# Patient Record
(demographics unavailable — no encounter records)

---

## 2020-10-03 NOTE — EKG
[facility] [address], [place] [address]

Test Date:    2020-10-03               Test Time:    01:26:07

Pat Name:     ROSALES RODRIGUEZ           Department:   

Patient ID:   [id number]           Room:          

Gender:       F                        Technician:   NEHEMIAH

:          1974               Requested By: MIGUEL A CORONEL

Order Number: [id number]            Reading MD:     

                                 Measurements

Intervals                              Axis          

Rate:         101                      P:            66

NJ:           144                      QRS:          77

QRSD:         76                       T:            71

QT:           374                                    

QTc:          486                                    

                           Interpretive Statements

SINUS TACHYCARDIA

VENTRICULAR PREMATURE COMPLEX(ES)

LEFT ATRIAL ABNORMALITY

ABNORMAL ECG

RI6.02

No previous ECG available for comparison

## 2020-10-03 NOTE — PHYS DOC
Past History


Past Medical History:  Anxiety, GERD, Other


Past Surgical History:  Appendectomy, , Other


Smoking:  Cigarettes, Greater than 1 pack/day


Alcohol Use:  None


Drug Use:  Cocaine, Marijuana, Methamphetamine





General Adult


EDM:


Chief Complaint:  DRUG ABUSE





HPI:


HPI:


"... I don't know what the fuck.. is going on.... I did about 10 dollars worth 

of meth yesterday.. smoked it.. but it did not feel like meth... I ve been 

fucked up in the head since.. seeing colors,   sprinkled lights,  high... not 

the meth feeling.. I do about meth at least 10 dollars a day.. smoke it.. and 

only shoot up once a year..  I do marijuana all day... but new lisa gave me the 

meth.. that has fucked up my mind.. I was doing laundry. . at my friends house 

tonight.. and started really tripping.. really fucked up in the head... memory 

is messed up..  I  can't focus... I know meth.. but that was not meth I did 

yesterday..."





Patient is a 45 year old female who presents with complaints of hallucinations 

primarily visual after shooting up meth yesterday.  Complains of difficulty in 

maintaining focus and memory since the IV drug use yesterday.  Patient states 

she normally only smokes meth daily and only IV use maybe once or twice a year. 

Patient does smoke tobacco and marijuana daily.  Patient denies any trauma.  

Patient denies any fever chills.  Patient denies any travel outside the [place] area.  Patient denies any history of immunosuppression.  Patient normally 

follows at [facility]. Pt.  follow-s with Dr. Cooper.





Review of Systems:


Review of Systems:


Constitutional:  Denies fever or chills 


Eyes:  Denies change in visual acuity 


HENT:  Denies nasal congestion or sore throat 


Respiratory:  Denies cough or shortness of breath 


Cardiovascular:  Denies chest pain or edema 


GI:  Denies abdominal pain, nausea, vomiting, bloody stools or diarrhea 


: Denies dysuria 


Musculoskeletal:  Denies back pain or joint pain 


Integument:  Denies rash 


Neurologic:  Denies headache, focal weakness or sensory changes 


Endocrine:  Denies polyuria or polydipsia 


Lymphatic:  Denies swollen glands 


Psychiatric:  Complaints of visual hallucinations and  anxiety





Heart Score:


HEART Score for Chest Pain:  








HEART Score for Chest Pain Response (Comments) Value


 


History Slighlty/Non-Suspicious 0


 


ECG Nonspecific Repolarizatio 1


 


Age >45 - < 65 1


 


Risk Factors                            1 or 2 Risk Factors 1


 


Troponin < Normal Limit 0


 


Total  3








Risk Factors:


Risk Factors:  DM, Current or recent (<one month) smoker, HTN, HLP, family 

history of CAD, obesity.


Risk Scores:


Score 0 - 3:  2.5% MACE over next 6 weeks - Discharge Home


Score 4 - 6:  20.3% MACE over next 6 weeks - Admit for Clinical Observation


Score 7 - 10:  72.7% MACE over next 6 weeks - Early Invasive Strategies





Family History:


Family History:


Noncontributory





Current Medications:


Current Meds:


See nursing for home meds





Allergies:


Allergies:





Allergies








Coded Allergies Type Severity Reaction Last Updated Verified


 


  Penicillins Allergy Unknown  14 No


 


  banana Allergy Unknown  17 Yes


 


  egg Allergy Unknown  17 Yes











Physical Exam:


PE:





Constitutional:  no acute distress, non-toxic appearance. []


HENT: Normocephalic, atraumatic, bilateral external ears normal, oropharynx 

moist, no oral exudates, nose normal. []


Eyes: PERRLA, EOMI, conjunctiva normal, no discharge. [] 


Neck: Normal range of motion, no tenderness, supple, no stridor. [] 


Cardiovascular: Tachycardia heart rate regular rhythm, no murmur []


Lungs & Thorax:  Bilateral breath sounds equal apex with scattered wheezes on 

auscultation []


Abdomen: Bowel sounds normal, soft, no tenderness, no masses, no pulsatile 

masses.  Old surgery scars


Skin: Warm, dry, no erythema, no rash.  Needle injection sites


Back: No tenderness, no CVA tenderness. [] 


Extremities: No tenderness, no cyanosis, no clubbing, ROM intact, no edema. [] 


Neurologic: Alert and oriented X 3, normal motor function, normal sensory 

function, no focal deficits noted.  DTRs +2 patella.  Ambulatory without 

problems.  No drift.   equal.


Psychologic: Affect anxious, judgement normal, mood normal.  Complains of visual

 hallucinations





Current Patient Data:


Vital Signs:





                                   Vital Signs








  Date Time  Temp Pulse Resp B/P (MAP) Pulse Ox O2 Delivery O2 Flow Rate FiO2


 


10/3/20 00:35 98.1 115 22 140/101 (114) 98   











EKG:


EKG:


My interpretation of EKG shows a sinus tachycardia 101 bpm.  Occasional PVC.  

Does have bimodal P waves and left leads.  No findings of acute STEMI or 

contralateral changes.  []





Radiology/Procedures:


Radiology/Procedures:


[[facility] [address], [place] [address] [phone]


                                        


                                 IMAGING REPORT





                                     Signed





PATIENT: ROSALES RODRIGUEZ   ACCOUNT: [id number]     MRN#: [id number]


: 1974           LOCATION: ER              AGE: 45


SEX: F                    EXAM DT: 10/03/20         ACCESSION#: [id number]


STATUS: PRE ER            ORD. PHYSICIAN: MIGUEL A CORONEL MD


REASON: mental status change after smoking meth yesterday


PROCEDURE: CT HEAD WO CONTRAST





CT head without contrast:


 


Reason for examination: Mental status change after smoking methamphetamine


yesterday.


 


Comparison is made to previous study dated 2014.


 


Axial images were obtained through the brain. No contrast was 


administered.


 


Exposure: One or more of the following individualized dose reduction 


techniques were utilized for this examination:  1. Automated exposure 


control  2. Adjustment of the mA and/or kV according to patient size  3. 


Use of iterative reconstruction technique.


 


Ventricular systems are symmetric and not dilated. No midline shift is 


seen. There is no evidence of intracranial hemorrhage, infarct, mass or 


edema. No abnormalities are seen at the orbits. The paranasal sinuses and 


mastoid air cells are clear. No acute skull abnormality is seen.


 


IMPRESSION:


 


No acute intracranial abnormality evident.


 


Electronically signed by: Maia Avina MD (10/3/2020 2:02 AM) [facility]














DICTATED AND SIGNED BY:     MAIA AVINA MD


DATE:     10/03/20 0202





CC: MIGUEL A CORONEL MD; KENIA COOPER MD ~


][facility] [address], [place] [address] [phone]


                                        


                                 IMAGING REPORT





                                     Signed





PATIENT: ROSALES RODRIGUEZ   ACCOUNT: [id number]     MRN#: [id number]


: 1974           LOCATION: ER              AGE: 45


SEX: F                    EXAM DT: 10/03/20         ACCESSION#: [id number]


STATUS: PRE ER            ORD. PHYSICIAN: MIGUEL A CORONEL MD


REASON: dyspnea, cough


PROCEDURE: PORTABLE CHEST 1V





Chest AP portable at 0143:


 


Reason for examination: Dyspnea and cough.


 


Comparison is made to previous study dated 2014.


 


The heart size is normal. Mediastinum is unremarkable. Lung fields are 


clear. A nodular density seen at the posterior ninth rib level probably 


represents a nipple shadow. No acute bony abnormalities are seen. Several 


metallic densities are seen consistent with BBs and have not changed.


 


Impression:


 


No acute cardiopulmonary disease.


 


Electronically signed by: Maia Avina MD (10/3/2020 2:01 AM) [facility]














DICTATED AND SIGNED BY:     MAIA AVINA MD


DATE:     10/03/20 020





CC: MIGUEL A CORONEL MD; KENIA COOPER MD ~





Course & Med Decision Making:


Course & Med Decision Making


Pertinent Labs and Imaging studies reviewed. (See chart for details)





Patient follow-up primary care.  Patient avoid further methamphetamine use.  

Patient to consider follow-up with counseling center for drug rehab.  Patient 

encouraged to stop smoking.  Patient push fluids.





Impression:





1.  Chronic methamphetamine use


2.  Tobacco marijuana use


3.  Hallucinations after most recent methamphetamine IV use





[]





Dragon Disclaimer:


Dragon Disclaimer:


This electronic medical record was generated, in whole or in part, using a voice

 recognition dictation system.





Departure


Departure:


Disposition:  01 HOME/RESIDENCE PRIOR TO ADM


Condition:  STABLE


Referrals:  


KENIA COOPER MD (PCP)





Dragon Disclaimer


This chart was dictated in whole or in part using Voice Recognition software in 

a busy, high-work load, and often noisy Emergency Department environment.  It 

may contain unintended and wholly unrecognized errors or omissions.





Dragon Disclaimer


This chart was dictated in whole or in part using Voice Recognition software in 

a busy, high-work load, and often noisy Emergency Department environment.  It 

may contain unintended and wholly unrecognized errors or omissions.











MIGUEL A CORONEL MD            Oct 3, 2020 01:15

## 2020-10-03 NOTE — RAD
CT head without contrast:

 

Reason for examination: Mental status change after smoking methamphetamine

yesterday.

 

Comparison is made to previous study dated 8/27/2014.

 

Axial images were obtained through the brain. No contrast was 

administered.

 

Exposure: One or more of the following individualized dose reduction 

techniques were utilized for this examination:  1. Automated exposure 

control  2. Adjustment of the mA and/or kV according to patient size  3. 

Use of iterative reconstruction technique.

 

Ventricular systems are symmetric and not dilated. No midline shift is 

seen. There is no evidence of intracranial hemorrhage, infarct, mass or 

edema. No abnormalities are seen at the orbits. The paranasal sinuses and 

mastoid air cells are clear. No acute skull abnormality is seen.

 

IMPRESSION:

 

No acute intracranial abnormality evident.

 

Electronically signed by: Alona Baez MD (10/3/2020 2:02 AM) [facility]

## 2020-10-03 NOTE — RAD
Chest AP portable at 0143:

 

Reason for examination: Dyspnea and cough.

 

Comparison is made to previous study dated 8/27/2014.

 

The heart size is normal. Mediastinum is unremarkable. Lung fields are 

clear. A nodular density seen at the posterior ninth rib level probably 

represents a nipple shadow. No acute bony abnormalities are seen. Several 

metallic densities are seen consistent with BBs and have not changed.

 

Impression:

 

No acute cardiopulmonary disease.

 

Electronically signed by: Alona Baez MD (10/3/2020 2:01 AM) [facility]